# Patient Record
Sex: FEMALE | Race: WHITE | NOT HISPANIC OR LATINO | URBAN - METROPOLITAN AREA
[De-identification: names, ages, dates, MRNs, and addresses within clinical notes are randomized per-mention and may not be internally consistent; named-entity substitution may affect disease eponyms.]

---

## 2020-07-23 ENCOUNTER — OFFICE VISIT (OUTPATIENT)
Dept: URBAN - METROPOLITAN AREA CLINIC 13 | Facility: CLINIC | Age: 50
End: 2020-07-23

## 2020-07-25 ENCOUNTER — TELEPHONE ENCOUNTER (OUTPATIENT)
Dept: URBAN - METROPOLITAN AREA CLINIC 13 | Facility: CLINIC | Age: 50
End: 2020-07-25

## 2020-07-25 RX ORDER — DOCUSATE CALCIUM 240 MG
TAKE 1 CAPSULE  PRN CAPSULE ORAL
Refills: 0 | OUTPATIENT
End: 2017-08-17

## 2020-07-25 RX ORDER — METRONIDAZOLE 500 MG/1
TAKE 1 TABLET TWICE DAILY UNTIL FINISHED TABLET ORAL
Qty: 10 | Refills: 0 | OUTPATIENT
Start: 2017-10-12 | End: 2017-11-20

## 2020-07-25 RX ORDER — ESCITALOPRAM 10 MG/1
TAKE 1 TABLET DAILY TABLET, FILM COATED ORAL
Refills: 0 | OUTPATIENT
End: 2017-05-17

## 2020-07-25 RX ORDER — METHENAMINE, SODIUM PHOSPHATE, MONOBASIC, MONOHYDRATE, PHENYL SALICYLATE, METHYLENE BLUE, AND HYOSCYAMINE SULFATE 118; 40.8; 36; 10; .12 MG/1; MG/1; MG/1; MG/1; MG/1
TAKE 1 CAPSULE DAILY PRN CAPSULE ORAL
Refills: 0 | OUTPATIENT
End: 2020-07-23

## 2020-07-25 RX ORDER — LUBIPROSTONE 8 UG/1
TAKE 1 CAPSULE TWICE DAILY CAPSULE, GELATIN COATED ORAL
Qty: 180 | Refills: 3 | OUTPATIENT
Start: 2016-05-24 | End: 2016-08-17

## 2020-07-25 RX ORDER — LINACLOTIDE 145 UG/1
TAKE 1 CAPSULE DAILY EMPTY STOMACH, 30 MINUTES BEFORE BREAKFAST CAPSULE, GELATIN COATED ORAL
Qty: 90 | Refills: 3 | OUTPATIENT
Start: 2016-03-28 | End: 2017-06-06

## 2020-07-25 RX ORDER — BUPROPION HYDROCHLORIDE 150 MG/1
TAKE 1 TABLET DAILY TABLET, EXTENDED RELEASE ORAL
Refills: 0 | OUTPATIENT
End: 2017-10-10

## 2020-07-25 RX ORDER — VITAM B12 100 MCG
TAKE 1 TABLET DAILY AS DIRECTED TAB ORAL
Refills: 0 | OUTPATIENT
End: 2017-05-17

## 2020-07-25 RX ORDER — CIPROFLOXACIN HYDROCHLORIDE 500 MG/1
TAKE 1 TABLET TWICE DAILY TABLET, FILM COATED ORAL
Qty: 10 | Refills: 0 | OUTPATIENT
Start: 2017-10-12 | End: 2017-11-20

## 2020-07-25 RX ORDER — AZELASTINE HYDROCHLORIDE 137 UG/1
USE 1 TO 2 SPRAYS IN EACH NOSTRIL TWICE DAILY AS NEEDED SPRAY, METERED NASAL
Refills: 0 | OUTPATIENT
End: 2017-05-17

## 2020-07-25 RX ORDER — PSEUDOEPHEDRINE HCL 30 MG
TABLET ORAL
Refills: 0 | OUTPATIENT
End: 2017-05-17

## 2020-07-25 RX ORDER — DIAZEPAM 10 MG/1
TABLET ORAL
Refills: 0 | OUTPATIENT
End: 2017-05-17

## 2020-07-26 ENCOUNTER — TELEPHONE ENCOUNTER (OUTPATIENT)
Dept: URBAN - METROPOLITAN AREA CLINIC 13 | Facility: CLINIC | Age: 50
End: 2020-07-26

## 2020-07-26 RX ORDER — AMOXICILLIN AND CLAVULANATE POTASSIUM 875; 125 MG/1; MG/1
TAKE ONE TABLET BY MOUTH TWICE A DAY TABLET, FILM COATED ORAL
Qty: 20 | Refills: 0 | Status: ACTIVE | COMMUNITY
Start: 2017-12-28

## 2020-07-26 RX ORDER — DOXYCYCLINE HYCLATE 100 MG/1
TK 1 T PO BID TABLET ORAL
Qty: 20 | Refills: 0 | Status: ACTIVE | COMMUNITY
Start: 2019-07-29

## 2020-07-26 RX ORDER — VALACYCLOVIR 1 G/1
TK 1 T PO Q 12 H TABLET, FILM COATED ORAL
Qty: 4 | Refills: 0 | Status: ACTIVE | COMMUNITY
Start: 2019-11-15

## 2020-07-26 RX ORDER — PREDNISONE 20 MG/1
TK 1 T PO BID TABLET ORAL
Qty: 10 | Refills: 0 | Status: ACTIVE | COMMUNITY
Start: 2019-07-29

## 2020-07-26 RX ORDER — NORETHINDRONE, ETHINYL ESTRADIOL, AND FERROUS FUMARATE 0.8-25(24)
CSW 1 T PO D KIT ORAL
Qty: 28 | Refills: 0 | Status: ACTIVE | COMMUNITY
Start: 2019-04-24

## 2020-07-26 RX ORDER — PROMETHAZINE HYDROCHLORIDE 25 MG/1
TK 1/2 - 1 T PO Q 4 H PRN FOR NAUSEA TABLET ORAL
Qty: 30 | Refills: 0 | Status: ACTIVE | COMMUNITY
Start: 2019-12-23

## 2020-07-26 RX ORDER — LEVOTHYROXINE, LIOTHYRONINE 19; 4.5 UG/1; UG/1
TABLET ORAL
Qty: 30 | Refills: 0 | Status: ACTIVE | COMMUNITY
Start: 2019-12-23

## 2020-07-26 RX ORDER — PROGESTERONE 200 MG/1
TK ONE C PO QHS CAPSULE, LIQUID FILLED ORAL
Qty: 90 | Refills: 0 | Status: ACTIVE | COMMUNITY
Start: 2019-12-23

## 2020-07-26 RX ORDER — FLUCONAZOLE 150 MG/1
TK 1 T PO QD FOR 1 DAY TABLET ORAL
Qty: 1 | Refills: 0 | Status: ACTIVE | COMMUNITY
Start: 2019-07-29

## 2020-07-26 RX ORDER — POLYETHYLENE GLYCOL 3350, SODIUM CHLORIDE, SODIUM BICARBONATE AND POTASSIUM CHLORIDE WITH LEMON FLAVOR 420; 11.2; 5.72; 1.48 G/4L; G/4L; G/4L; G/4L
MIX CONTENTS PER PKG DIRECTIONS. DAY PRIOR TO PROCEDURE BEGIN DRINKING 1/2 SOULTION @ 5PM, COMPLETE  SECOND 1/2 6HR PRIOR TO PROCEDURE POWDER, FOR SOLUTION ORAL
Qty: 1 | Refills: 0 | Status: ACTIVE | COMMUNITY
Start: 2020-07-23

## 2020-07-26 RX ORDER — FLUCONAZOLE 150 MG/1
TAKE ONE TABLET BY MOUTH NOW, MAY REPEAT IN 3 DAYS AS NEEDED TABLET ORAL
Qty: 2 | Refills: 0 | Status: ACTIVE | COMMUNITY
Start: 2018-05-09

## 2020-07-26 RX ORDER — LINACLOTIDE 290 UG/1
TAKE 1 CAPSULE DAILY CAPSULE, GELATIN COATED ORAL
Qty: 90 | Refills: 3 | Status: ACTIVE | COMMUNITY
Start: 2017-06-06

## 2020-07-26 RX ORDER — NORETHINDRONE ACETATE AND ETHINYL ESTRADIOL 1; 20 MG/1; UG/1
TAKE 1 TABLET DAILY TABLET ORAL
Refills: 0 | Status: ACTIVE | COMMUNITY

## 2020-07-26 RX ORDER — CEFDINIR 300 MG/1
TK 1 C PO Q 12 H CAPSULE ORAL
Qty: 20 | Refills: 0 | Status: ACTIVE | COMMUNITY
Start: 2019-07-12

## 2020-08-04 ENCOUNTER — WEB ENCOUNTER (OUTPATIENT)
Dept: URBAN - METROPOLITAN AREA CLINIC 113 | Facility: CLINIC | Age: 50
End: 2020-08-04

## 2020-08-07 ENCOUNTER — OFFICE VISIT (OUTPATIENT)
Dept: URBAN - METROPOLITAN AREA SURGERY CENTER 25 | Facility: SURGERY CENTER | Age: 50
End: 2020-08-07
Payer: COMMERCIAL

## 2020-08-07 DIAGNOSIS — K64.8 HEMORRHOIDS, INTERNAL: ICD-10-CM

## 2020-08-07 DIAGNOSIS — K57.30 COLON, DIVERTICULOSIS: ICD-10-CM

## 2020-08-07 DIAGNOSIS — Z12.11 COLON CANCER SCREENING: ICD-10-CM

## 2020-08-07 PROCEDURE — G0121 COLON CA SCRN NOT HI RSK IND: HCPCS | Performed by: INTERNAL MEDICINE

## 2020-08-07 RX ORDER — NORETHINDRONE ACETATE AND ETHINYL ESTRADIOL 1; 20 MG/1; UG/1
TAKE 1 TABLET DAILY TABLET ORAL
Refills: 0 | Status: ACTIVE | COMMUNITY

## 2020-08-07 RX ORDER — NORETHINDRONE, ETHINYL ESTRADIOL, AND FERROUS FUMARATE 0.8-25(24)
CSW 1 T PO D KIT ORAL
Qty: 28 | Refills: 0 | Status: ACTIVE | COMMUNITY
Start: 2019-04-24

## 2020-08-07 RX ORDER — CEFDINIR 300 MG/1
TK 1 C PO Q 12 H CAPSULE ORAL
Qty: 20 | Refills: 0 | Status: ACTIVE | COMMUNITY
Start: 2019-07-12

## 2020-08-07 RX ORDER — PROGESTERONE 200 MG/1
TK ONE C PO QHS CAPSULE, LIQUID FILLED ORAL
Qty: 90 | Refills: 0 | Status: ACTIVE | COMMUNITY
Start: 2019-12-23

## 2020-08-07 RX ORDER — LEVOTHYROXINE, LIOTHYRONINE 19; 4.5 UG/1; UG/1
TABLET ORAL
Qty: 30 | Refills: 0 | Status: ACTIVE | COMMUNITY
Start: 2019-12-23

## 2020-08-07 RX ORDER — DOXYCYCLINE HYCLATE 100 MG/1
TK 1 T PO BID TABLET ORAL
Qty: 20 | Refills: 0 | Status: ACTIVE | COMMUNITY
Start: 2019-07-29

## 2020-08-07 RX ORDER — PROMETHAZINE HYDROCHLORIDE 25 MG/1
TK 1/2 - 1 T PO Q 4 H PRN FOR NAUSEA TABLET ORAL
Qty: 30 | Refills: 0 | Status: ACTIVE | COMMUNITY
Start: 2019-12-23

## 2020-08-07 RX ORDER — FLUCONAZOLE 150 MG/1
TAKE ONE TABLET BY MOUTH NOW, MAY REPEAT IN 3 DAYS AS NEEDED TABLET ORAL
Qty: 2 | Refills: 0 | Status: ACTIVE | COMMUNITY
Start: 2018-05-09

## 2020-08-07 RX ORDER — PREDNISONE 20 MG/1
TK 1 T PO BID TABLET ORAL
Qty: 10 | Refills: 0 | Status: ACTIVE | COMMUNITY
Start: 2019-07-29

## 2020-08-07 RX ORDER — POLYETHYLENE GLYCOL 3350, SODIUM CHLORIDE, SODIUM BICARBONATE AND POTASSIUM CHLORIDE WITH LEMON FLAVOR 420; 11.2; 5.72; 1.48 G/4L; G/4L; G/4L; G/4L
MIX CONTENTS PER PKG DIRECTIONS. DAY PRIOR TO PROCEDURE BEGIN DRINKING 1/2 SOULTION @ 5PM, COMPLETE  SECOND 1/2 6HR PRIOR TO PROCEDURE POWDER, FOR SOLUTION ORAL
Qty: 1 | Refills: 0 | Status: ACTIVE | COMMUNITY
Start: 2020-07-23

## 2020-08-07 RX ORDER — LINACLOTIDE 290 UG/1
TAKE 1 CAPSULE DAILY CAPSULE, GELATIN COATED ORAL
Qty: 90 | Refills: 3 | Status: ACTIVE | COMMUNITY
Start: 2017-06-06

## 2020-08-07 RX ORDER — VALACYCLOVIR 1 G/1
TK 1 T PO Q 12 H TABLET, FILM COATED ORAL
Qty: 4 | Refills: 0 | Status: ACTIVE | COMMUNITY
Start: 2019-11-15

## 2020-08-07 RX ORDER — AMOXICILLIN AND CLAVULANATE POTASSIUM 875; 125 MG/1; MG/1
TAKE ONE TABLET BY MOUTH TWICE A DAY TABLET, FILM COATED ORAL
Qty: 20 | Refills: 0 | Status: ACTIVE | COMMUNITY
Start: 2017-12-28

## 2020-11-09 ENCOUNTER — ERX REFILL RESPONSE (OUTPATIENT)
Age: 50
End: 2020-11-09

## 2020-11-09 RX ORDER — LINACLOTIDE 290 UG/1
TAKE 1 CAPSULE DAILY CAPSULE, GELATIN COATED ORAL
Qty: 90 | Refills: 3

## 2021-01-19 ENCOUNTER — ERX REFILL RESPONSE (OUTPATIENT)
Age: 51
End: 2021-01-19

## 2021-01-19 RX ORDER — POLYETHYLENE GLYCOL 3350 17 G/17G
FILL DOSING CUP TO MARKED LINE (17 GRAMS) AND MIX IN 8 OUNCES OF WATER, JUICE OR TEA AND DRINK DAILY POWDER, FOR SOLUTION ORAL
Qty: 1530 | Refills: 3

## 2023-03-13 ENCOUNTER — TELEPHONE ENCOUNTER (OUTPATIENT)
Dept: URBAN - METROPOLITAN AREA CLINIC 107 | Facility: CLINIC | Age: 53
End: 2023-03-13

## 2023-03-13 RX ORDER — LINACLOTIDE 290 UG/1
1 CAPSULE AT LEAST 30 MINUTES BEFORE THE FIRST MEAL OF THE DAY ON AN EMPTY STOMACH CAPSULE, GELATIN COATED ORAL ONCE A DAY
Qty: 30 | Refills: 0

## 2023-03-28 ENCOUNTER — OFFICE VISIT (OUTPATIENT)
Dept: URBAN - METROPOLITAN AREA CLINIC 107 | Facility: CLINIC | Age: 53
End: 2023-03-28
Payer: COMMERCIAL

## 2023-03-28 ENCOUNTER — DASHBOARD ENCOUNTERS (OUTPATIENT)
Age: 53
End: 2023-03-28

## 2023-03-28 VITALS
HEIGHT: 62 IN | DIASTOLIC BLOOD PRESSURE: 85 MMHG | WEIGHT: 129 LBS | SYSTOLIC BLOOD PRESSURE: 109 MMHG | HEART RATE: 84 BPM | BODY MASS INDEX: 23.74 KG/M2 | TEMPERATURE: 97.9 F

## 2023-03-28 DIAGNOSIS — K58.1 IRRITABLE BOWEL SYNDROME WITH CONSTIPATION: ICD-10-CM

## 2023-03-28 PROCEDURE — 99213 OFFICE O/P EST LOW 20 MIN: CPT | Performed by: NURSE PRACTITIONER

## 2023-03-28 RX ORDER — DOXYCYCLINE HYCLATE 100 MG/1
TK 1 T PO BID TABLET ORAL
Qty: 20 | Refills: 0 | Status: ON HOLD | COMMUNITY
Start: 2019-07-29

## 2023-03-28 RX ORDER — AMOXICILLIN AND CLAVULANATE POTASSIUM 875; 125 MG/1; MG/1
TAKE ONE TABLET BY MOUTH TWICE A DAY TABLET, FILM COATED ORAL
Qty: 20 | Refills: 0 | Status: ON HOLD | COMMUNITY
Start: 2017-12-28

## 2023-03-28 RX ORDER — VALACYCLOVIR 1 G/1
TK 1 T PO Q 12 H TABLET, FILM COATED ORAL
Qty: 4 | Refills: 0 | Status: ON HOLD | COMMUNITY
Start: 2019-11-15

## 2023-03-28 RX ORDER — POLYETHYLENE GLYCOL 3350, SODIUM CHLORIDE, SODIUM BICARBONATE AND POTASSIUM CHLORIDE WITH LEMON FLAVOR 420; 11.2; 5.72; 1.48 G/4L; G/4L; G/4L; G/4L
MIX CONTENTS PER PKG DIRECTIONS. DAY PRIOR TO PROCEDURE BEGIN DRINKING 1/2 SOULTION @ 5PM, COMPLETE  SECOND 1/2 6HR PRIOR TO PROCEDURE POWDER, FOR SOLUTION ORAL
Qty: 1 | Refills: 0 | Status: ON HOLD | COMMUNITY
Start: 2020-07-23

## 2023-03-28 RX ORDER — PROGESTERONE 200 MG/1
TK ONE C PO QHS CAPSULE, LIQUID FILLED ORAL
Qty: 90 | Refills: 0 | Status: ACTIVE | COMMUNITY
Start: 2019-12-23

## 2023-03-28 RX ORDER — NORETHINDRONE, ETHINYL ESTRADIOL, AND FERROUS FUMARATE 0.8-25(24)
CSW 1 T PO D KIT ORAL
Qty: 28 | Refills: 0 | Status: ON HOLD | COMMUNITY
Start: 2019-04-24

## 2023-03-28 RX ORDER — CEFDINIR 300 MG/1
TK 1 C PO Q 12 H CAPSULE ORAL
Qty: 20 | Refills: 0 | Status: ON HOLD | COMMUNITY
Start: 2019-07-12

## 2023-03-28 RX ORDER — POLYETHYLENE GLYCOL 3350 17 G/17G
FILL DOSING CUP TO MARKED LINE (17 GRAMS) AND MIX IN 8 OUNCES OF WATER, JUICE OR TEA AND DRINK DAILY POWDER, FOR SOLUTION ORAL
Qty: 1530 | Refills: 3 | Status: ON HOLD | COMMUNITY

## 2023-03-28 RX ORDER — FLUCONAZOLE 150 MG/1
TAKE ONE TABLET BY MOUTH NOW, MAY REPEAT IN 3 DAYS AS NEEDED TABLET ORAL
Qty: 2 | Refills: 0 | Status: ON HOLD | COMMUNITY
Start: 2018-05-09

## 2023-03-28 RX ORDER — PREDNISONE 20 MG/1
TK 1 T PO BID TABLET ORAL
Qty: 10 | Refills: 0 | Status: ON HOLD | COMMUNITY
Start: 2019-07-29

## 2023-03-28 RX ORDER — NORETHINDRONE ACETATE AND ETHINYL ESTRADIOL 1; 20 MG/1; UG/1
TAKE 1 TABLET DAILY TABLET ORAL
Refills: 0 | Status: ON HOLD | COMMUNITY

## 2023-03-28 RX ORDER — PROMETHAZINE HYDROCHLORIDE 25 MG/1
TK 1/2 - 1 T PO Q 4 H PRN FOR NAUSEA TABLET ORAL
Qty: 30 | Refills: 0 | Status: ON HOLD | COMMUNITY
Start: 2019-12-23

## 2023-03-28 RX ORDER — LINACLOTIDE 290 UG/1
1 CAPSULE AT LEAST 30 MINUTES BEFORE THE FIRST MEAL OF THE DAY ON AN EMPTY STOMACH CAPSULE, GELATIN COATED ORAL ONCE A DAY
Status: ACTIVE | COMMUNITY

## 2023-03-28 RX ORDER — LINACLOTIDE 290 UG/1
1 CAPSULE AT LEAST 30 MINUTES BEFORE THE FIRST MEAL OF THE DAY ON AN EMPTY STOMACH CAPSULE, GELATIN COATED ORAL ONCE A DAY
Qty: 30 | Refills: 0 | Status: ON HOLD | COMMUNITY

## 2023-03-28 RX ORDER — LEVOTHYROXINE, LIOTHYRONINE 19; 4.5 UG/1; UG/1
TABLET ORAL
Qty: 30 | Refills: 0 | Status: ACTIVE | COMMUNITY
Start: 2019-12-23

## 2023-03-28 RX ORDER — LORATADINE 10 MG/1
1 CAPSULE CAPSULE, LIQUID FILLED ORAL ONCE A DAY
Status: ACTIVE | COMMUNITY

## 2023-03-28 NOTE — HPI-TODAY'S VISIT:
52yo female with IBS-C presenting for long-interval follow-up/medication refill.  She was last seen in the office in August 2018 for follow-up of constipation, with increasing symptoms despite Linzes 290mcg daily. She was recommended the addition of daily MiraLAX with Dulcolax as needed. She had a screening colonoscopy in August 2020 which revealed mild diverticulosis in the sigmoid colon and nonbleeding internal hemorrhoids.  Her constipation has improved on the aforementioned regimen. She is having a daily nonbloody stool with Linzess. She takes MiraLAX every few days as needed. No abdominal pain, nausea or vomiting. She recently saw her PCP for her routine physical and he refilled her Linzess.

## 2023-03-28 NOTE — HPI-OTHER HISTORIES
CTAP w/ contrast 10/11/17: Mild distention and circumferential wall thickening involving the first stage of the duodenum concerning for duodenitis. A small amount of gas superior to the first stage of the duodenum appears to be located within the second stage of the duodenum and so no definite extraluminal air is seen. Hepatic cysts and several additional hypoattenuating lesions scattered throughout the hepatic parenchyma which are too small to further characterize on this study but also statistically represent cysts. Unremarkable UGI w/ SBFT 11/6/17. EGD 11/20/17 revealed a normal hypopharynx, regular Z line, normal stomach, ampulla, duodenal bulb, second and third portion of the duodenum. Random biopsies were unremarkable.